# Patient Record
(demographics unavailable — no encounter records)

---

## 2019-10-18 NOTE — OP
PATIENT NAME:  JASWANT MEDEIROS                         MEDICAL RECORD: [id number]
:68                                             LOCATION:D.OPS          
                                                         ADMISSION DATE:        
SURGEON:  DERECK MENDOZA MD        
 
 
DATE OF OPERATION:  10/17/2019
 
PREOPERATIVE DIAGNOSES:  Impingement syndrome of the left shoulder with rotator
cuff tear.
 
POSTOPERATIVE DIAGNOSES:  Impingement syndrome of the left shoulder with rotator
cuff tear.
 
PROCEDURES:
1.  Arthroscopic rotator cuff repair of the left shoulder.
2.  Arthroscopic distal clavicle excision of the left shoulder - 1 cm done
through separate incision, biceps tenotomy.
3.  Subacromial decompression with acromioplasty and bursectomy.
 
SURGEON:  Dereck Mendoza MD
 
ANESTHESIA:  General.
 
INTRAOPERATIVE COMPLICATIONS:  None.
 
SUMMARY OF PATHOLOGIC FINDINGS:  Ms. Medeiros had full thickness rotator cuff
tear, severe biceps tendinitis, type 3 acromion with excoriation of the
coracoacromial ligament and acromioclavicular arthritis.  All seen at the time
of diagnostic arthroscopy.
 
OPERATIVE SUMMARY IN DETAIL:  After obtaining the appropriate preoperative
orthopedic surgery consent as well as anesthetic consultation, evaluation and
clearance, the patient was brought to the operating room and placed on the
operating table in supine position.  After general laryngeal mask airway was
administered, the patient was placed in right lateral decubitus position.  All
pressure points were well padded to include down leg peroneal pad as well as
axillary roll.  The patient was held firmly to the operating table using the
vacuum pack suction system.  Left upper extremity and shoulder were then prepped
and draped in routine sterile fashion.  The arm was held in the [employer] traction
boom at 30 degrees of forward flexion, 30 degrees of abduction, and 10 pounds of
traction laterally.  Arthroscopy was established in the glenohumeral joint from
the posterior portal.  Anterior portal was established in the anterior safe
interval.  Diagnostic arthroscopy did reveal the patient had full thickness
rotator cuff tearing as well as severe biceps tendinitis.  The [place] tissue
ablation system was utilized to complete the patient's biceps tenotomy at the
bicipital labral junction.  Sensory lateral portal was created for transrotator
cuff tear visualization.  The arthroscopic resector was utilized to decorticate,
denude the supraspinatus tendinous footprint as well as debride portions of the
supraspinatus tendon.  Having completed this, attention was turned to the
subacromial space.  While on subacromial space, a surface tissue ablation system
was utilized to denude the undersurface of the acromion of all soft tissue
elements and release the coracoacromial ligament.  A 5-0 barrel bur was used to
perform acromioplasty at the level of acromioclavicular joint.  Then through a
separate portal anteriorly, distal clavicle was excised for 1 cm.  Attention was
then turned to the rotator cuff.  Further decortication was carried out followed
by passing a [employer] arthroscopically in an inverted mattress fashion.  This
was then anchored laterally with a 5.5 SwiveLock from [employer], resulted in
 
 
 
OPERATIVE REPORT                               [id number]    JASWANT MEDEIROS       
 
 
excellent reapproximation of the rotator cuff.  Having completed this,
arthroscopy portals were closed in routine interrupted fashion using 4-0
Prolene.  Sterile dressings were applied.  The patient was awakened and taken to
recovery room in stable condition.  All final needle and sponge counts were
correct.
 
TRANSINT:[id number] Voice Confirmation ID: [id number] DOCUMENT ID: [id number]
                                           
                                           REJI REBOLLEDO, DERECK SMITH        
 
 
 
Electronically Signed by DERECK MENDOZA MD on 10/18/19 at 1429
 
 
 
 
 
 
 
 
 
 
 
 
 
 
 
 
 
 
 
 
 
 
 
 
 
 
 
 
 
 
 
 
 
 
CC:                                                             [id number]
DICTATION DATE: 10/18/19 1128     :     10/18/19 1204      [facility] 
                                                                      10/17/19
[address] [place] [address]